# Patient Record
Sex: MALE | Race: WHITE | NOT HISPANIC OR LATINO | Employment: UNEMPLOYED | URBAN - NONMETROPOLITAN AREA
[De-identification: names, ages, dates, MRNs, and addresses within clinical notes are randomized per-mention and may not be internally consistent; named-entity substitution may affect disease eponyms.]

---
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
## 2023-02-07 ENCOUNTER — TELEPHONE (OUTPATIENT)
Dept: FAMILY MEDICINE CLINIC | Facility: CLINIC | Age: 18
End: 2023-02-07
Payer: OTHER GOVERNMENT

## 2023-02-20 DIAGNOSIS — F90.0 ATTENTION DEFICIT HYPERACTIVITY DISORDER (ADHD), PREDOMINANTLY INATTENTIVE TYPE: ICD-10-CM

## 2023-02-21 RX ORDER — DEXTROAMPHETAMINE SACCHARATE, AMPHETAMINE ASPARTATE, DEXTROAMPHETAMINE SULFATE AND AMPHETAMINE SULFATE 2.5; 2.5; 2.5; 2.5 MG/1; MG/1; MG/1; MG/1
10 TABLET ORAL 2 TIMES DAILY
Qty: 60 TABLET | Refills: 0 | Status: SHIPPED | OUTPATIENT
Start: 2023-02-21 | End: 2023-03-30 | Stop reason: SDUPTHER

## 2023-02-27 ENCOUNTER — TELEPHONE (OUTPATIENT)
Dept: FAMILY MEDICINE CLINIC | Facility: CLINIC | Age: 18
End: 2023-02-27
Payer: OTHER GOVERNMENT

## 2023-02-28 DIAGNOSIS — F90.0 ATTENTION DEFICIT HYPERACTIVITY DISORDER (ADHD), PREDOMINANTLY INATTENTIVE TYPE: Primary | ICD-10-CM

## 2023-02-28 RX ORDER — METHYLPHENIDATE HYDROCHLORIDE 10 MG/1
TABLET ORAL
Qty: 45 TABLET | Refills: 0 | Status: SHIPPED | OUTPATIENT
Start: 2023-02-28 | End: 2023-03-30

## 2023-03-30 ENCOUNTER — TELEPHONE (OUTPATIENT)
Dept: FAMILY MEDICINE CLINIC | Facility: CLINIC | Age: 18
End: 2023-03-30
Payer: OTHER GOVERNMENT

## 2023-03-30 DIAGNOSIS — F90.0 ATTENTION DEFICIT HYPERACTIVITY DISORDER (ADHD), PREDOMINANTLY INATTENTIVE TYPE: ICD-10-CM

## 2023-03-30 RX ORDER — DEXTROAMPHETAMINE SACCHARATE, AMPHETAMINE ASPARTATE, DEXTROAMPHETAMINE SULFATE AND AMPHETAMINE SULFATE 2.5; 2.5; 2.5; 2.5 MG/1; MG/1; MG/1; MG/1
10 TABLET ORAL 2 TIMES DAILY
Qty: 60 TABLET | Refills: 0 | Status: SHIPPED | OUTPATIENT
Start: 2023-03-30

## 2023-03-30 NOTE — TELEPHONE ENCOUNTER
Brett called and you switched him from Adderall to Ritalin because of the medication shortage.  The Ritalin is not working he would like to be switched back or maybe to a extended release if possible       Please advise

## 2023-05-22 ENCOUNTER — OFFICE VISIT (OUTPATIENT)
Dept: PSYCHIATRY | Facility: CLINIC | Age: 18
End: 2023-05-22
Payer: OTHER GOVERNMENT

## 2023-05-22 VITALS
OXYGEN SATURATION: 99 % | SYSTOLIC BLOOD PRESSURE: 146 MMHG | TEMPERATURE: 98.2 F | HEIGHT: 70 IN | DIASTOLIC BLOOD PRESSURE: 81 MMHG | HEART RATE: 122 BPM | BODY MASS INDEX: 22.33 KG/M2 | WEIGHT: 156 LBS

## 2023-05-22 DIAGNOSIS — F90.0 ATTENTION DEFICIT HYPERACTIVITY DISORDER (ADHD), PREDOMINANTLY INATTENTIVE TYPE: ICD-10-CM

## 2023-05-22 PROCEDURE — 99214 OFFICE O/P EST MOD 30 MIN: CPT | Performed by: NURSE PRACTITIONER

## 2023-05-22 RX ORDER — DEXTROAMPHETAMINE SACCHARATE, AMPHETAMINE ASPARTATE, DEXTROAMPHETAMINE SULFATE AND AMPHETAMINE SULFATE 5; 5; 5; 5 MG/1; MG/1; MG/1; MG/1
20 TABLET ORAL 2 TIMES DAILY
Qty: 60 TABLET | Refills: 0 | Status: SHIPPED | OUTPATIENT
Start: 2023-05-22 | End: 2024-05-21

## 2023-05-22 NOTE — PROGRESS NOTES
"      Truman Sherwood is a 18 y.o. male is here today for medication management follow-up.    Chief Complaint:  Recheck ADHD    History of Present Illness: Patient presents for his follow-up appointment at [facility].  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  Says he really like the job.  Now that he is having to concentrate more he feels his adderall is not helping as much.  Says he feels \"scatter brained\" and worries this will affect his job performance.  Denies any depression.  No excessive anxiety.  Body mass index is 22.38 kg/m². weight loss 5 lbs since November.  Sleeping well.  Does have to be at work at 5 AM.  Takes his second dosage of med around noon.  Mood is stable.  He is not taking the adderall on the weekends unless he has some type of project he is working on.    PHQ-2 Depression Screening  Little interest or pleasure in doing things? 0-->not at all   Feeling down, depressed, or hopeless? 0-->not at all   PHQ-2 Total Score 0              . The following portions of the patient's history were reviewed and updated as appropriate: allergies, current medications, past family history, past medical history, past social history, past surgical history and problem list.    Review of Systems   Constitutional: Negative for activity change, appetite change and fatigue.   HENT: Negative.    Eyes: Negative for visual disturbance.   Respiratory: Negative.    Cardiovascular: Negative.    Gastrointestinal: Negative for nausea.   Endocrine: Negative.    Genitourinary: Negative.    Musculoskeletal: Negative for arthralgias.   Skin: Negative.    Allergic/Immunologic: Negative.    Neurological: Negative for dizziness, seizures and headaches.   Hematological: Negative.    Psychiatric/Behavioral: Negative for agitation, behavioral problems, confusion, decreased concentration, dysphoric mood, hallucinations, self-injury, sleep disturbance and suicidal ideas. The " "patient is nervous/anxious. The patient is not hyperactive.      Reviewed copied data and there are no changes    Objective   Physical Exam  Vitals reviewed.   Constitutional:       Appearance: Normal appearance.   Musculoskeletal:      Cervical back: Normal range of motion and neck supple.   Neurological:      General: No focal deficit present.      Mental Status: He is alert and oriented to person, place, and time.   Psychiatric:         Attention and Perception: Attention normal.         Mood and Affect: Mood normal.         Speech: Speech normal.         Behavior: Behavior normal. Behavior is cooperative.         Thought Content: Thought content normal.         Cognition and Memory: Cognition normal.         Judgment: Judgment normal.      Comments: Pleasant and cooperative       Blood pressure 146/81, pulse (!) 122, temperature 98.2 °F (36.8 °C), height 177.8 cm (70\"), weight 70.8 kg (156 lb), SpO2 99 %.    Medication List:   Current Outpatient Medications   Medication Sig Dispense Refill   • amphetamine-dextroamphetamine (Adderall) 20 MG tablet Take 1 tablet by mouth 2 (Two) Times a Day. 60 tablet 0   • cetirizine (zyrTEC) 10 MG tablet Take 10 mg by mouth Daily.     • Diclofenac Sodium (VOLTAREN) 1 % gel gel Daily As Needed.       No current facility-administered medications for this visit.     Reviewed copied data and there are no changes    Mental Status Exam:   Hygiene:   good  Cooperation:  Cooperative  Eye Contact:  Good  Psychomotor Behavior:  Appropriate  Affect:  Full range  Hopelessness: Denies  Speech:  Normal  Thought Process:  Goal directed  Thought Content:  Normal  Suicidal:  None  Homicidal:  None  Hallucinations:  None  Delusion:  None  Memory:  Intact  Orientation:  Person, Place, Time and Situation  Reliability:  good  Insight:  Good  Judgement:  Good  Impulse Control:  Good  Physical/Medical Issues:  No     Assessment & Plan   Problems Addressed this Visit    None  Visit Diagnoses     " Attention deficit hyperactivity disorder (ADHD), predominantly inattentive type        Relevant Medications    amphetamine-dextroamphetamine (Adderall) 20 MG tablet      Diagnoses       Codes Comments    Attention deficit hyperactivity disorder (ADHD), predominantly inattentive type     ICD-10-CM: F90.0  ICD-9-CM: 314.00           Functionality: pt having minimal impairment in important areas of daily functioning.  Prognosis: Good dependent on medication/follow up and treatment plan compliance.  star reviewed.  uds obtained today and pending.     Increasing the adderall to 20 mg BID.  Told him he can try taking one half adderall as his second dosage during the day if he does not feel he needs 20mg later in the day.  Refill submitted.     Continuing efforts to promote the therapeutic alliance, address the patient's issues, and strengthen self awareness, insights, and coping skills Patient is agreeable to call the Clinic with worsening symptoms.    Patient is aware to call 911 or go to the nearest ER should begin having SI/HI. RTC 4 months.  Sooner if needed or if the adderall is not working.  We can try to preauth vyvanse at that point if needed.               This document has been electronically signed by ARMANDO Hebert on   May 22, 2023 08:42 EDT.

## 2023-08-24 ENCOUNTER — OFFICE VISIT (OUTPATIENT)
Dept: PSYCHIATRY | Facility: CLINIC | Age: 18
End: 2023-08-24
Payer: OTHER GOVERNMENT

## 2023-08-24 VITALS
WEIGHT: 166.4 LBS | OXYGEN SATURATION: 98 % | SYSTOLIC BLOOD PRESSURE: 147 MMHG | TEMPERATURE: 98.9 F | DIASTOLIC BLOOD PRESSURE: 76 MMHG | BODY MASS INDEX: 23.82 KG/M2 | HEART RATE: 107 BPM | HEIGHT: 70 IN

## 2023-08-24 DIAGNOSIS — F40.10 SOCIAL ANXIETY DISORDER: ICD-10-CM

## 2023-08-24 DIAGNOSIS — F90.0 ATTENTION DEFICIT HYPERACTIVITY DISORDER (ADHD), PREDOMINANTLY INATTENTIVE TYPE: Primary | ICD-10-CM

## 2023-08-24 DIAGNOSIS — F41.1 GENERALIZED ANXIETY DISORDER: ICD-10-CM

## 2023-08-24 PROCEDURE — 99214 OFFICE O/P EST MOD 30 MIN: CPT | Performed by: NURSE PRACTITIONER

## 2023-08-24 RX ORDER — DEXTROAMPHETAMINE SACCHARATE, AMPHETAMINE ASPARTATE, DEXTROAMPHETAMINE SULFATE AND AMPHETAMINE SULFATE 5; 5; 5; 5 MG/1; MG/1; MG/1; MG/1
20 TABLET ORAL 2 TIMES DAILY
Qty: 60 TABLET | Refills: 0 | Status: SHIPPED | OUTPATIENT
Start: 2023-08-24 | End: 2024-08-23

## 2023-08-24 NOTE — PROGRESS NOTES
Truman Sherwood is a 18 y.o. male is here today for medication management follow-up.    Chief Complaint:  Recheck ADHD    History of Present Illness: Patient presents for his follow-up appointment at [facility].  He states that he is doing much better with anxiety on the Zoloft.  He states he feels better all around.  Anxiety is rated at a 1-2 out of 10 with 10 being severe.  He states it has really helped with his repetitive thoughts that he feels like it has actually helped with his focus and concentration.  The Adderall continues to help.  He denies any depression.  Sleeping well without any difficulty.  No negative side effects to the meds..Body mass index is 23.88 kg/mý.  Weight gain of 10 pounds since last office visit.  No medical stressors.  He is very pleased with progress.      .         . The following portions of the patient's history were reviewed and updated as appropriate: allergies, current medications, past family history, past medical history, past social history, past surgical history and problem list.    Review of Systems   Constitutional:  Negative for activity change, appetite change and fatigue.   HENT: Negative.     Eyes:  Negative for visual disturbance.   Respiratory: Negative.     Cardiovascular: Negative.    Gastrointestinal:  Negative for nausea.   Endocrine: Negative.    Genitourinary: Negative.    Musculoskeletal:  Negative for arthralgias.   Skin: Negative.    Allergic/Immunologic: Negative.    Neurological:  Negative for dizziness, seizures and headaches.   Hematological: Negative.    Psychiatric/Behavioral:  Negative for agitation, behavioral problems, confusion, decreased concentration, dysphoric mood, hallucinations, self-injury, sleep disturbance and suicidal ideas. The patient is nervous/anxious. The patient is not hyperactive.    Reviewed copied data and there are no changes    Objective   Physical Exam  Vitals reviewed.   Constitutional:        "Appearance: Normal appearance.   Musculoskeletal:      Cervical back: Normal range of motion and neck supple.   Neurological:      General: No focal deficit present.      Mental Status: He is alert and oriented to person, place, and time.   Psychiatric:         Attention and Perception: Attention normal.         Mood and Affect: Mood normal.         Speech: Speech normal.         Behavior: Behavior normal. Behavior is cooperative.         Thought Content: Thought content normal.         Cognition and Memory: Cognition normal.         Judgment: Judgment normal.      Comments: Pleasant and cooperative     Blood pressure 147/76, pulse 107, temperature 98.9 øF (37.2 øC), height 177.8 cm (70\"), weight 75.5 kg (166 lb 6.4 oz), SpO2 98 %.    Medication List:   Current Outpatient Medications   Medication Sig Dispense Refill    amphetamine-dextroamphetamine (Adderall) 20 MG tablet Take 1 tablet by mouth 2 (Two) Times a Day. 60 tablet 0    sertraline (Zoloft) 50 MG tablet Take 1 tablet by mouth Daily. 30 tablet 2    cetirizine (zyrTEC) 10 MG tablet Take 10 mg by mouth Daily.      Diclofenac Sodium (VOLTAREN) 1 % gel gel Daily As Needed.       No current facility-administered medications for this visit.     Reviewed copied data and there are no changes    Mental Status Exam:   Hygiene:   good  Cooperation:  Cooperative  Eye Contact:  Good  Psychomotor Behavior:  Appropriate  Affect:  Full range  Hopelessness: Denies  Speech:  Normal  Thought Process:  Goal directed  Thought Content:  Normal  Suicidal:  None  Homicidal:  None  Hallucinations:  None  Delusion:  None  Memory:  Intact  Orientation:  Person, Place, Time and Situation  Reliability:  good  Insight:  Good  Judgement:  Good  Impulse Control:  Good  Physical/Medical Issues:  No     Assessment & Plan   Problems Addressed this Visit    None  Visit Diagnoses       Attention deficit hyperactivity disorder (ADHD), predominantly inattentive type    -  Primary    Relevant " Medications    sertraline (Zoloft) 50 MG tablet    amphetamine-dextroamphetamine (Adderall) 20 MG tablet    Generalized anxiety disorder        Relevant Medications    sertraline (Zoloft) 50 MG tablet    amphetamine-dextroamphetamine (Adderall) 20 MG tablet    Social anxiety disorder        Relevant Medications    sertraline (Zoloft) 50 MG tablet    amphetamine-dextroamphetamine (Adderall) 20 MG tablet          Diagnoses         Codes Comments    Attention deficit hyperactivity disorder (ADHD), predominantly inattentive type    -  Primary ICD-10-CM: F90.0  ICD-9-CM: 314.00     Generalized anxiety disorder     ICD-10-CM: F41.1  ICD-9-CM: 300.02     Social anxiety disorder     ICD-10-CM: F40.10  ICD-9-CM: 300.23             Functionality: pt having moderate impairment in important areas of daily functioning.  Prognosis: Good dependent on medication/follow up and treatment plan compliance.  star reviewed.  uds reviewed.      He will continue the Adderall for the ADHD.  He will continue the Zoloft for the anxiety disorder.  Refills have been submitted.  He verbalized understanding.  Should he have any problems he will notify me.        Continuing efforts to promote the therapeutic alliance, address the patient's issues, and strengthen self awareness, insights, and coping skills Patient is agreeable to call the Clinic with worsening symptoms.    Patient is aware to call 911 or go to the nearest ER should begin having SI/HI. RTC 12 weeks             This document has been electronically signed by ARMANDO Hebert on   August 24, 2023 13:15 EDT.

## 2023-11-22 DIAGNOSIS — F40.10 SOCIAL ANXIETY DISORDER: ICD-10-CM

## 2023-11-27 ENCOUNTER — OFFICE VISIT (OUTPATIENT)
Dept: PSYCHIATRY | Facility: CLINIC | Age: 18
End: 2023-11-27
Payer: OTHER GOVERNMENT

## 2023-11-27 VITALS
HEIGHT: 70 IN | DIASTOLIC BLOOD PRESSURE: 79 MMHG | BODY MASS INDEX: 23.71 KG/M2 | HEART RATE: 111 BPM | TEMPERATURE: 97.1 F | WEIGHT: 165.6 LBS | OXYGEN SATURATION: 99 % | SYSTOLIC BLOOD PRESSURE: 132 MMHG

## 2023-11-27 DIAGNOSIS — F40.10 SOCIAL ANXIETY DISORDER: ICD-10-CM

## 2023-11-27 DIAGNOSIS — F90.0 ATTENTION DEFICIT HYPERACTIVITY DISORDER (ADHD), PREDOMINANTLY INATTENTIVE TYPE: Primary | ICD-10-CM

## 2023-11-27 DIAGNOSIS — F41.1 GENERALIZED ANXIETY DISORDER: ICD-10-CM

## 2023-11-27 PROCEDURE — 99214 OFFICE O/P EST MOD 30 MIN: CPT | Performed by: NURSE PRACTITIONER

## 2023-11-27 RX ORDER — DEXTROAMPHETAMINE SACCHARATE, AMPHETAMINE ASPARTATE, DEXTROAMPHETAMINE SULFATE AND AMPHETAMINE SULFATE 5; 5; 5; 5 MG/1; MG/1; MG/1; MG/1
20 TABLET ORAL 2 TIMES DAILY
Qty: 60 TABLET | Refills: 0 | Status: SHIPPED | OUTPATIENT
Start: 2023-11-27 | End: 2024-11-26

## 2023-11-27 RX ORDER — SERTRALINE HYDROCHLORIDE 100 MG/1
100 TABLET, FILM COATED ORAL DAILY
Qty: 30 TABLET | Refills: 2 | Status: SHIPPED | OUTPATIENT
Start: 2023-11-27

## 2023-11-27 NOTE — PROGRESS NOTES
Truman Sherwood is a 18 y.o. male is here today for medication management follow-up.    Chief Complaint:  Recheck ADHD    History of Present Illness: Patient presents for his follow-up appointment at [facility].  He states that he is doing well however he feels like his anxiety is creeping back up and would like to increase his Zoloft up to 100 mg.  He is starting to replay things in his mind again and feels like the dosage needs increased.  He denies any depression.  Denies panic attacks.  The Adderall continues to help with focus and concentration.  He continues to work full-time.  He has now bought a house.  Has set a date with his fiancée and is getting  next October.  He is excited about this.  Being well at night without difficulty.  Mood is stable.  No medical stressors.Body mass index is 23.76 kg/m².  No appetite changes.  He does not take the stimulant on the weekends usually only on workdays.  He is also continuing to take classes in college and will finish up with that around May.      .         . The following portions of the patient's history were reviewed and updated as appropriate: allergies, current medications, past family history, past medical history, past social history, past surgical history and problem list.    Review of Systems   Constitutional:  Negative for activity change, appetite change and fatigue.   HENT: Negative.     Eyes:  Negative for visual disturbance.   Respiratory: Negative.     Cardiovascular: Negative.    Gastrointestinal:  Negative for nausea.   Endocrine: Negative.    Genitourinary: Negative.    Musculoskeletal:  Negative for arthralgias.   Skin: Negative.    Allergic/Immunologic: Negative.    Neurological:  Negative for dizziness, seizures and headaches.   Hematological: Negative.    Psychiatric/Behavioral:  Negative for agitation, behavioral problems, confusion, decreased concentration, dysphoric mood, hallucinations, self-injury,  "sleep disturbance and suicidal ideas. The patient is nervous/anxious. The patient is not hyperactive.      Reviewed copied data and there are no changes    Objective   Physical Exam  Vitals reviewed.   Constitutional:       Appearance: Normal appearance.   Musculoskeletal:      Cervical back: Normal range of motion and neck supple.   Neurological:      General: No focal deficit present.      Mental Status: He is alert and oriented to person, place, and time.   Psychiatric:         Attention and Perception: Attention normal.         Mood and Affect: Mood normal.         Speech: Speech normal.         Behavior: Behavior normal. Behavior is cooperative.         Thought Content: Thought content normal.         Cognition and Memory: Cognition normal.         Judgment: Judgment normal.      Comments: Pleasant and cooperative       Blood pressure 132/79, pulse 111, temperature 97.1 °F (36.2 °C), height 177.8 cm (70\"), weight 75.1 kg (165 lb 9.6 oz), SpO2 99%.    Medication List:   Current Outpatient Medications   Medication Sig Dispense Refill    amphetamine-dextroamphetamine (Adderall) 20 MG tablet Take 1 tablet by mouth 2 (Two) Times a Day. 60 tablet 0    sertraline (ZOLOFT) 100 MG tablet Take 1 tablet by mouth Daily. 30 tablet 2    cetirizine (zyrTEC) 10 MG tablet Take 10 mg by mouth Daily.      Diclofenac Sodium (VOLTAREN) 1 % gel gel Daily As Needed.       No current facility-administered medications for this visit.     Reviewed copied data and there are no changes    Mental Status Exam:   Hygiene:   good  Cooperation:  Cooperative  Eye Contact:  Good  Psychomotor Behavior:  Appropriate  Affect:  Full range  Hopelessness: Denies  Speech:  Normal  Thought Process:  Goal directed  Thought Content:  Normal  Suicidal:  None  Homicidal:  None  Hallucinations:  None  Delusion:  None  Memory:  Intact  Orientation:  Person, Place, Time and Situation  Reliability:  good  Insight:  Good  Judgement:  Good  Impulse Control:  " Good  Physical/Medical Issues:  No     Assessment & Plan   Problems Addressed this Visit    None  Visit Diagnoses       Attention deficit hyperactivity disorder (ADHD), predominantly inattentive type    -  Primary    Relevant Medications    amphetamine-dextroamphetamine (Adderall) 20 MG tablet    sertraline (ZOLOFT) 100 MG tablet    Generalized anxiety disorder        Relevant Medications    amphetamine-dextroamphetamine (Adderall) 20 MG tablet    sertraline (ZOLOFT) 100 MG tablet    Social anxiety disorder        Relevant Medications    amphetamine-dextroamphetamine (Adderall) 20 MG tablet    sertraline (ZOLOFT) 100 MG tablet          Diagnoses         Codes Comments    Attention deficit hyperactivity disorder (ADHD), predominantly inattentive type    -  Primary ICD-10-CM: F90.0  ICD-9-CM: 314.00     Generalized anxiety disorder     ICD-10-CM: F41.1  ICD-9-CM: 300.02     Social anxiety disorder     ICD-10-CM: F40.10  ICD-9-CM: 300.23             Functionality: pt having moderate impairment in important areas of daily functioning.  Prognosis: Good dependent on medication/follow up and treatment plan compliance.  star reviewed.  uds reviewed.      He will continue the Adderall for the ADHD.  Going to increase the Zoloft up to 100 mg for the ongoing anxiety.  Refills have been submitted.  We will return to clinic in 12 weeks.  He does not feel like he needs to come back in for a recheck sooner and states that he will notify me should any problems develop or his anxiety worsens.       Continuing efforts to promote the therapeutic alliance, address the patient's issues, and strengthen self awareness, insights, and coping skills Patient is agreeable to call the Clinic with worsening symptoms.    Patient is aware to call 911 or go to the nearest ER should begin having SI/HI. RTC 12 weeks             This document has been electronically signed by ARMANDO Hebert on   November 27, 2023 15:17 EST.

## 2024-02-27 ENCOUNTER — OFFICE VISIT (OUTPATIENT)
Dept: PSYCHIATRY | Facility: CLINIC | Age: 19
End: 2024-02-27
Payer: OTHER GOVERNMENT

## 2024-02-27 VITALS
DIASTOLIC BLOOD PRESSURE: 83 MMHG | BODY MASS INDEX: 23.45 KG/M2 | TEMPERATURE: 97.5 F | HEART RATE: 88 BPM | WEIGHT: 163.8 LBS | SYSTOLIC BLOOD PRESSURE: 153 MMHG | OXYGEN SATURATION: 97 % | HEIGHT: 70 IN

## 2024-02-27 DIAGNOSIS — F40.10 SOCIAL ANXIETY DISORDER: ICD-10-CM

## 2024-02-27 DIAGNOSIS — F90.0 ATTENTION DEFICIT HYPERACTIVITY DISORDER (ADHD), PREDOMINANTLY INATTENTIVE TYPE: ICD-10-CM

## 2024-02-27 PROCEDURE — 99214 OFFICE O/P EST MOD 30 MIN: CPT | Performed by: NURSE PRACTITIONER

## 2024-02-27 RX ORDER — DEXTROAMPHETAMINE SACCHARATE, AMPHETAMINE ASPARTATE, DEXTROAMPHETAMINE SULFATE AND AMPHETAMINE SULFATE 5; 5; 5; 5 MG/1; MG/1; MG/1; MG/1
20 TABLET ORAL 2 TIMES DAILY
Qty: 60 TABLET | Refills: 0 | Status: SHIPPED | OUTPATIENT
Start: 2024-02-27 | End: 2025-02-26

## 2024-02-27 RX ORDER — SERTRALINE HYDROCHLORIDE 100 MG/1
100 TABLET, FILM COATED ORAL DAILY
Qty: 30 TABLET | Refills: 2 | Status: SHIPPED | OUTPATIENT
Start: 2024-02-27

## 2024-02-27 NOTE — PROGRESS NOTES
Truman Sherwood is a 18 y.o. male is here today for medication management follow-up.    Chief Complaint:  Recheck ADHD    History of Present Illness: Patient presents for his follow-up appointment at [facility].  He states that he is doing really well.  He continues to work full-time.  The stimulant continues to help with focus and concentration.  He states he is taking it anywhere from 5-8 times a week depending upon the length of his workday etc.  Denies any negative side effects.  The increase in the Zoloft from the last visit really did help his anxiety.  He currently rates his anxiety a 1-2 out of 10 with 10 being severe.  Sleeping well at night without difficulty.  Denies any depression.  No medical stressors.Body mass index is 23.5 kg/m².  No appetite changes.  Mood is stable.  .         . The following portions of the patient's history were reviewed and updated as appropriate: allergies, current medications, past family history, past medical history, past social history, past surgical history and problem list.    Review of Systems   Constitutional:  Negative for activity change, appetite change and fatigue.   HENT: Negative.     Eyes:  Negative for visual disturbance.   Respiratory: Negative.     Cardiovascular: Negative.    Gastrointestinal:  Negative for nausea.   Endocrine: Negative.    Genitourinary: Negative.    Musculoskeletal:  Negative for arthralgias.   Skin: Negative.    Allergic/Immunologic: Negative.    Neurological:  Negative for dizziness, seizures and headaches.   Hematological: Negative.    Psychiatric/Behavioral:  Negative for agitation, behavioral problems, confusion, decreased concentration, dysphoric mood, hallucinations, self-injury, sleep disturbance and suicidal ideas. The patient is nervous/anxious. The patient is not hyperactive.      Reviewed copied data and there are no changes    Objective   Physical Exam  Vitals reviewed.   Constitutional:        "Appearance: Normal appearance.   Musculoskeletal:      Cervical back: Normal range of motion and neck supple.   Neurological:      General: No focal deficit present.      Mental Status: He is alert and oriented to person, place, and time.   Psychiatric:         Attention and Perception: Attention normal.         Mood and Affect: Mood normal.         Speech: Speech normal.         Behavior: Behavior normal. Behavior is cooperative.         Thought Content: Thought content normal.         Cognition and Memory: Cognition normal.         Judgment: Judgment normal.      Comments: Pleasant and cooperative       Blood pressure 153/83, pulse 88, temperature 97.5 °F (36.4 °C), height 177.8 cm (70\"), weight 74.3 kg (163 lb 12.8 oz), SpO2 97%.    Medication List:   Current Outpatient Medications   Medication Sig Dispense Refill    amphetamine-dextroamphetamine (Adderall) 20 MG tablet Take 1 tablet by mouth 2 (Two) Times a Day. 60 tablet 0    cetirizine (zyrTEC) 10 MG tablet Take 1 tablet by mouth Daily.      sertraline (ZOLOFT) 100 MG tablet Take 1 tablet by mouth Daily. 30 tablet 2     No current facility-administered medications for this visit.     Reviewed copied data and there are no changes    Mental Status Exam:   Hygiene:   good  Cooperation:  Cooperative  Eye Contact:  Good  Psychomotor Behavior:  Appropriate  Affect:  Full range  Hopelessness: Denies  Speech:  Normal  Thought Process:  Goal directed  Thought Content:  Normal  Suicidal:  None  Homicidal:  None  Hallucinations:  None  Delusion:  None  Memory:  Intact  Orientation:  Person, Place, Time and Situation  Reliability:  good  Insight:  Good  Judgement:  Good  Impulse Control:  Good  Physical/Medical Issues:  No     Assessment & Plan   Problems Addressed this Visit    None  Visit Diagnoses       Social anxiety disorder        Relevant Medications    sertraline (ZOLOFT) 100 MG tablet    amphetamine-dextroamphetamine (Adderall) 20 MG tablet    Attention deficit " hyperactivity disorder (ADHD), predominantly inattentive type        Relevant Medications    sertraline (ZOLOFT) 100 MG tablet    amphetamine-dextroamphetamine (Adderall) 20 MG tablet          Diagnoses         Codes Comments    Social anxiety disorder     ICD-10-CM: F40.10  ICD-9-CM: 300.23     Attention deficit hyperactivity disorder (ADHD), predominantly inattentive type     ICD-10-CM: F90.0  ICD-9-CM: 314.00             Functionality: pt having moderate impairment in important areas of daily functioning.  Prognosis: Good dependent on medication/follow up and treatment plan compliance.  star reviewed.  uds reviewed.  Uds obtained today and pending.      He will continue the Adderall for the ADHD.  Continue the Zoloft for the anxiety.  Refills have been submitted.  return to clinic in 12 weeks.      Continuing efforts to promote the therapeutic alliance, address the patient's issues, and strengthen self awareness, insights, and coping skills Patient is agreeable to call the Clinic with worsening symptoms.    Patient is aware to call 911 or go to the nearest ER should begin having SI/HI. RTC 12 weeks             This document has been electronically signed by ARMANDO Hebert on   February 27, 2024 10:03 EST.

## 2024-02-28 DIAGNOSIS — F40.10 SOCIAL ANXIETY DISORDER: ICD-10-CM

## 2024-02-28 RX ORDER — SERTRALINE HYDROCHLORIDE 100 MG/1
100 TABLET, FILM COATED ORAL DAILY
Qty: 30 TABLET | Refills: 2 | OUTPATIENT
Start: 2024-02-28

## 2024-05-28 ENCOUNTER — OFFICE VISIT (OUTPATIENT)
Dept: PSYCHIATRY | Facility: CLINIC | Age: 19
End: 2024-05-28
Payer: COMMERCIAL

## 2024-05-28 VITALS
HEART RATE: 79 BPM | OXYGEN SATURATION: 98 % | DIASTOLIC BLOOD PRESSURE: 78 MMHG | SYSTOLIC BLOOD PRESSURE: 156 MMHG | TEMPERATURE: 98.4 F | WEIGHT: 170.6 LBS | BODY MASS INDEX: 24.42 KG/M2 | HEIGHT: 70 IN

## 2024-05-28 DIAGNOSIS — F40.10 SOCIAL ANXIETY DISORDER: ICD-10-CM

## 2024-05-28 DIAGNOSIS — F90.0 ATTENTION DEFICIT HYPERACTIVITY DISORDER (ADHD), PREDOMINANTLY INATTENTIVE TYPE: Primary | ICD-10-CM

## 2024-05-28 DIAGNOSIS — F41.1 GENERALIZED ANXIETY DISORDER: ICD-10-CM

## 2024-05-28 PROCEDURE — 99214 OFFICE O/P EST MOD 30 MIN: CPT | Performed by: NURSE PRACTITIONER

## 2024-05-28 RX ORDER — SERTRALINE HYDROCHLORIDE 100 MG/1
100 TABLET, FILM COATED ORAL DAILY
Qty: 30 TABLET | Refills: 2 | Status: SHIPPED | OUTPATIENT
Start: 2024-05-28

## 2024-05-28 RX ORDER — DEXTROAMPHETAMINE SACCHARATE, AMPHETAMINE ASPARTATE, DEXTROAMPHETAMINE SULFATE AND AMPHETAMINE SULFATE 5; 5; 5; 5 MG/1; MG/1; MG/1; MG/1
TABLET ORAL
Qty: 45 TABLET | Refills: 0 | Status: SHIPPED | OUTPATIENT
Start: 2024-05-28

## 2024-05-28 NOTE — PROGRESS NOTES
Truman Sherwood is a 19 y.o. male is here today for medication management follow-up.    Chief Complaint:  Recheck ADHD    History of Present Illness: Patient presents for his follow-up appointment at [facility].  He continues to do well.  He has been out of the Adderall for quite some time.  He has not been able to get it.  He can tell a difference in his focus and concentration without it.  States he is usually taking 1 in the morning and about half in the afternoon when he did have it.  He denies any depression.  Anxiety is stable and manageable.  He continues to work full-time.  Denies any negative side effects to the med.  Sleep is adequate.  Mood is stable.Body mass index is 24.48 kg/m².  Weight gain of 7 pounds since last office visit.  Getting  in October and continues to be very excited about this.      .         . The following portions of the patient's history were reviewed and updated as appropriate: allergies, current medications, past family history, past medical history, past social history, past surgical history and problem list.    Review of Systems   Constitutional:  Negative for activity change, appetite change and fatigue.   HENT: Negative.     Eyes:  Negative for visual disturbance.   Respiratory: Negative.     Cardiovascular: Negative.    Gastrointestinal:  Negative for nausea.   Endocrine: Negative.    Genitourinary: Negative.    Musculoskeletal:  Negative for arthralgias.   Skin: Negative.    Allergic/Immunologic: Negative.    Neurological:  Negative for dizziness, seizures and headaches.   Hematological: Negative.    Psychiatric/Behavioral:  Negative for agitation, behavioral problems, confusion, decreased concentration, dysphoric mood, hallucinations, self-injury, sleep disturbance and suicidal ideas. The patient is nervous/anxious. The patient is not hyperactive.      Reviewed copied data and there are no changes    Objective   Physical Exam  Vitals  reviewed.   Constitutional:       Appearance: Normal appearance.   Musculoskeletal:      Cervical back: Normal range of motion and neck supple.   Neurological:      General: No focal deficit present.      Mental Status: He is alert and oriented to person, place, and time.   Psychiatric:         Attention and Perception: Attention normal.         Mood and Affect: Mood normal.         Speech: Speech normal.         Behavior: Behavior normal. Behavior is cooperative.         Thought Content: Thought content normal.         Cognition and Memory: Cognition normal.         Judgment: Judgment normal.      Comments: Pleasant and cooperative       There were no vitals taken for this visit.    Medication List:   Current Outpatient Medications   Medication Sig Dispense Refill    amphetamine-dextroamphetamine (Adderall) 20 MG tablet Take 1 tablet by mouth 2 (Two) Times a Day. 60 tablet 0    cetirizine (zyrTEC) 10 MG tablet Take 1 tablet by mouth Daily.      sertraline (ZOLOFT) 100 MG tablet Take 1 tablet by mouth Daily. 30 tablet 2     No current facility-administered medications for this visit.     Reviewed copied data and there are no changes    Mental Status Exam:   Hygiene:   good  Cooperation:  Cooperative  Eye Contact:  Good  Psychomotor Behavior:  Appropriate  Affect:  Full range  Hopelessness: Denies  Speech:  Normal  Thought Process:  Goal directed  Thought Content:  Normal  Suicidal:  None  Homicidal:  None  Hallucinations:  None  Delusion:  None  Memory:  Intact  Orientation:  Person, Place, Time and Situation  Reliability:  good  Insight:  Good  Judgement:  Good  Impulse Control:  Good  Physical/Medical Issues:  No     Assessment & Plan   Problems Addressed this Visit    None  Visit Diagnoses       Attention deficit hyperactivity disorder (ADHD), predominantly inattentive type    -  Primary    Social anxiety disorder        Generalized anxiety disorder              Diagnoses         Codes Comments    Attention  deficit hyperactivity disorder (ADHD), predominantly inattentive type    -  Primary ICD-10-CM: F90.0  ICD-9-CM: 314.00     Social anxiety disorder     ICD-10-CM: F40.10  ICD-9-CM: 300.23     Generalized anxiety disorder     ICD-10-CM: F41.1  ICD-9-CM: 300.02             Functionality: pt having moderate impairment in important areas of daily functioning.  Prognosis: Good dependent on medication/follow up and treatment plan compliance.  star reviewed.  uds reviewed.      He is currently pleased with progress.  He will continue the Adderall for the ADHD.  He will continue the Zoloft for the anxiety disorder.  Refills of all been submitted.   Continuing efforts to promote the therapeutic alliance, address the patient's issues, and strengthen self awareness, insights, and coping skills Patient is agreeable to call the Clinic with worsening symptoms.    Patient is aware to call 911 or go to the nearest ER should begin having SI/HI. RTC 12 weeks             This document has been electronically signed by ARMANDO Hebert on   May 28, 2024 08:24 EDT.

## 2024-07-13 DIAGNOSIS — F40.10 SOCIAL ANXIETY DISORDER: ICD-10-CM

## 2024-07-13 DIAGNOSIS — F90.0 ATTENTION DEFICIT HYPERACTIVITY DISORDER (ADHD), PREDOMINANTLY INATTENTIVE TYPE: ICD-10-CM

## 2024-07-13 RX ORDER — DEXTROAMPHETAMINE SACCHARATE, AMPHETAMINE ASPARTATE, DEXTROAMPHETAMINE SULFATE AND AMPHETAMINE SULFATE 5; 5; 5; 5 MG/1; MG/1; MG/1; MG/1
TABLET ORAL
Qty: 45 TABLET | Refills: 0 | Status: CANCELLED | OUTPATIENT
Start: 2024-07-13

## 2024-07-15 DIAGNOSIS — F90.0 ATTENTION DEFICIT HYPERACTIVITY DISORDER (ADHD), PREDOMINANTLY INATTENTIVE TYPE: ICD-10-CM

## 2024-07-15 RX ORDER — DEXTROAMPHETAMINE SACCHARATE, AMPHETAMINE ASPARTATE, DEXTROAMPHETAMINE SULFATE AND AMPHETAMINE SULFATE 5; 5; 5; 5 MG/1; MG/1; MG/1; MG/1
TABLET ORAL
Qty: 45 TABLET | Refills: 0 | Status: SHIPPED | OUTPATIENT
Start: 2024-07-15

## 2024-07-15 RX ORDER — SERTRALINE HYDROCHLORIDE 100 MG/1
100 TABLET, FILM COATED ORAL DAILY
Qty: 30 TABLET | Refills: 2 | OUTPATIENT
Start: 2024-07-15

## 2024-07-23 DIAGNOSIS — F40.10 SOCIAL ANXIETY DISORDER: ICD-10-CM

## 2024-07-23 RX ORDER — SERTRALINE HYDROCHLORIDE 100 MG/1
100 TABLET, FILM COATED ORAL DAILY
Qty: 30 TABLET | Refills: 2 | OUTPATIENT
Start: 2024-07-23

## 2024-08-28 ENCOUNTER — OFFICE VISIT (OUTPATIENT)
Dept: PSYCHIATRY | Facility: CLINIC | Age: 19
End: 2024-08-28
Payer: COMMERCIAL

## 2024-08-28 VITALS
OXYGEN SATURATION: 99 % | SYSTOLIC BLOOD PRESSURE: 144 MMHG | HEART RATE: 109 BPM | WEIGHT: 161.6 LBS | BODY MASS INDEX: 23.13 KG/M2 | DIASTOLIC BLOOD PRESSURE: 87 MMHG | HEIGHT: 70 IN

## 2024-08-28 DIAGNOSIS — F40.10 SOCIAL ANXIETY DISORDER: Primary | ICD-10-CM

## 2024-08-28 DIAGNOSIS — F90.0 ATTENTION DEFICIT HYPERACTIVITY DISORDER (ADHD), PREDOMINANTLY INATTENTIVE TYPE: ICD-10-CM

## 2024-08-28 DIAGNOSIS — F41.1 GENERALIZED ANXIETY DISORDER: ICD-10-CM

## 2024-08-28 PROCEDURE — 99214 OFFICE O/P EST MOD 30 MIN: CPT | Performed by: NURSE PRACTITIONER

## 2024-08-28 RX ORDER — DEXTROAMPHETAMINE SACCHARATE, AMPHETAMINE ASPARTATE, DEXTROAMPHETAMINE SULFATE AND AMPHETAMINE SULFATE 5; 5; 5; 5 MG/1; MG/1; MG/1; MG/1
TABLET ORAL
Qty: 45 TABLET | Refills: 0 | Status: SHIPPED | OUTPATIENT
Start: 2024-08-28

## 2024-08-28 RX ORDER — SERTRALINE HYDROCHLORIDE 100 MG/1
100 TABLET, FILM COATED ORAL DAILY
Qty: 30 TABLET | Refills: 2 | Status: SHIPPED | OUTPATIENT
Start: 2024-08-28

## 2024-08-28 NOTE — PROGRESS NOTES
"      Truman Sherwood is a 19 y.o. male is here today for medication management follow-up.    Chief Complaint:  Recheck ADHD    History of Present Illness: Patient presents for his follow-up appointment at [facility].  He states that he has been out of his Zoloft for approximately a month.  He was told he had no refill at the pharmacy.  (Patient did have refilled the pharmacy must have been misinformed in parentheses.  He states that without the medicine his anxiety has come back and he is constantly second guessing himself at work thinking \"maybe I am not doing this right\".  So it does affect his work performance as well as his just overall anxiety in general.  He denies panic attacks denies any depression.  The stimulant continues to help with focus and concentration but he states with the anxiety starts it causes him to have problems finishing tasks.  He is usually not taking the stimulant on the weekends.  He is sleeping well at night without difficulty.  He rates his anxiety without medication at a 6 out of 10 with 10 being severe and rates it a 2 out of 10 with medication.  Mood is stable.  Medical stressors.  No negative side effects to the meds.Body mass index is 23.19 kg/m².  He does show a weight loss of 9 pounds since last office visit.  He contributes this just to being very busy over the summer.  Sleeping well without difficulty.  He is getting  in October and very excited about this      .         . The following portions of the patient's history were reviewed and updated as appropriate: allergies, current medications, past family history, past medical history, past social history, past surgical history and problem list.    Review of Systems   Constitutional:  Negative for activity change, appetite change and fatigue.   HENT: Negative.     Eyes:  Negative for visual disturbance.   Respiratory: Negative.     Cardiovascular: Negative.    Gastrointestinal:  Negative for " "nausea.   Endocrine: Negative.    Genitourinary: Negative.    Musculoskeletal:  Negative for arthralgias.   Skin: Negative.    Allergic/Immunologic: Negative.    Neurological:  Negative for dizziness, seizures and headaches.   Hematological: Negative.    Psychiatric/Behavioral:  Negative for agitation, behavioral problems, confusion, decreased concentration, dysphoric mood, hallucinations, self-injury, sleep disturbance and suicidal ideas. The patient is nervous/anxious. The patient is not hyperactive.      Reviewed copied data and there are no changes    Objective   Physical Exam  Vitals reviewed.   Constitutional:       Appearance: Normal appearance.   Musculoskeletal:      Cervical back: Normal range of motion and neck supple.   Neurological:      General: No focal deficit present.      Mental Status: He is alert and oriented to person, place, and time.   Psychiatric:         Attention and Perception: Attention normal.         Mood and Affect: Mood normal.         Speech: Speech normal.         Behavior: Behavior normal. Behavior is cooperative.         Thought Content: Thought content normal.         Cognition and Memory: Cognition normal.         Judgment: Judgment normal.      Comments: Pleasant and cooperative       Blood pressure 144/87, pulse 109, height 177.8 cm (70\"), weight 73.3 kg (161 lb 9.6 oz), SpO2 99%.    Medication List:   Current Outpatient Medications   Medication Sig Dispense Refill    amphetamine-dextroamphetamine (Adderall) 20 MG tablet Take one in the AM and one half in the afternoon 45 tablet 0    sertraline (ZOLOFT) 100 MG tablet Take 1 tablet by mouth Daily. 30 tablet 2    cetirizine (zyrTEC) 10 MG tablet Take 1 tablet by mouth Daily.       No current facility-administered medications for this visit.     Reviewed copied data and there are no changes    Mental Status Exam:   Hygiene:   good  Cooperation:  Cooperative  Eye Contact:  Good  Psychomotor Behavior:  Appropriate  Affect:  Full " range  Hopelessness: Denies  Speech:  Normal  Thought Process:  Goal directed  Thought Content:  Normal  Suicidal:  None  Homicidal:  None  Hallucinations:  None  Delusion:  None  Memory:  Intact  Orientation:  Person, Place, Time and Situation  Reliability:  good  Insight:  Good  Judgement:  Good  Impulse Control:  Good  Physical/Medical Issues:  No     Assessment & Plan   Problems Addressed this Visit    None  Visit Diagnoses       Social anxiety disorder    -  Primary    Relevant Medications    sertraline (ZOLOFT) 100 MG tablet    amphetamine-dextroamphetamine (Adderall) 20 MG tablet    Attention deficit hyperactivity disorder (ADHD), predominantly inattentive type        Relevant Medications    sertraline (ZOLOFT) 100 MG tablet    amphetamine-dextroamphetamine (Adderall) 20 MG tablet    Generalized anxiety disorder        Relevant Medications    sertraline (ZOLOFT) 100 MG tablet    amphetamine-dextroamphetamine (Adderall) 20 MG tablet          Diagnoses         Codes Comments    Social anxiety disorder    -  Primary ICD-10-CM: F40.10  ICD-9-CM: 300.23     Attention deficit hyperactivity disorder (ADHD), predominantly inattentive type     ICD-10-CM: F90.0  ICD-9-CM: 314.00     Generalized anxiety disorder     ICD-10-CM: F41.1  ICD-9-CM: 300.02             Functionality: pt having moderate impairment in important areas of daily functioning.  Prognosis: Good dependent on medication/follow up and treatment plan compliance.  star reviewed.  uds reviewed.      Going to restart him back on the Zoloft for the generalized anxiety disorder.  With the medication he is very pleased with progress..  He will continue the Adderall for the ADHD.  .  Refills of all been submitted.   Continuing efforts to promote the therapeutic alliance, address the patient's issues, and strengthen self awareness, insights, and coping skills Patient is agreeable to call the Clinic with worsening symptoms.    Patient is aware to call 911 or go  to the nearest ER should begin having SI/HI. RTC 12 weeks             This document has been electronically signed by ARMANDO Hebert on   August 28, 2024 09:36 EDT.

## 2024-11-26 ENCOUNTER — OFFICE VISIT (OUTPATIENT)
Dept: PSYCHIATRY | Facility: CLINIC | Age: 19
End: 2024-11-26
Payer: COMMERCIAL

## 2024-11-26 VITALS
HEIGHT: 70 IN | BODY MASS INDEX: 24.39 KG/M2 | HEART RATE: 93 BPM | OXYGEN SATURATION: 99 % | DIASTOLIC BLOOD PRESSURE: 85 MMHG | SYSTOLIC BLOOD PRESSURE: 131 MMHG | WEIGHT: 170.4 LBS

## 2024-11-26 DIAGNOSIS — F90.0 ATTENTION DEFICIT HYPERACTIVITY DISORDER (ADHD), PREDOMINANTLY INATTENTIVE TYPE: Primary | ICD-10-CM

## 2024-11-26 DIAGNOSIS — F41.1 GENERALIZED ANXIETY DISORDER: ICD-10-CM

## 2024-11-26 DIAGNOSIS — F40.10 SOCIAL ANXIETY DISORDER: ICD-10-CM

## 2024-11-26 PROCEDURE — 99214 OFFICE O/P EST MOD 30 MIN: CPT | Performed by: NURSE PRACTITIONER

## 2024-11-26 RX ORDER — SERTRALINE HYDROCHLORIDE 100 MG/1
100 TABLET, FILM COATED ORAL DAILY
Qty: 30 TABLET | Refills: 2 | Status: SHIPPED | OUTPATIENT
Start: 2024-11-26

## 2024-11-26 RX ORDER — DEXTROAMPHETAMINE SACCHARATE, AMPHETAMINE ASPARTATE, DEXTROAMPHETAMINE SULFATE AND AMPHETAMINE SULFATE 3.75; 3.75; 3.75; 3.75 MG/1; MG/1; MG/1; MG/1
15 TABLET ORAL DAILY
Qty: 30 TABLET | Refills: 0 | Status: SHIPPED | OUTPATIENT
Start: 2024-11-26

## 2024-11-26 NOTE — PROGRESS NOTES
Truman Sherwood is a 19 y.o. male is here today for medication management follow-up.    Chief Complaint:  Recheck ADHD    History of Present Illness: Patient presents for his follow-up appointment at [facility]. He states he has not been taking the adderall as much as he was and has been taking half tablet when he does take it.  He states that half a tablet does not work quite as well but he does not really feel like he needs a whole 1.  He denies any depression.  Anxiety is currently stable and he rates it a 1 out of 10 with 10 being severe.  Denies any negative side effects to the meds.  Has recently gotten  and is very happy about this.  Sleep is adequate.  Mood is stable.Body mass index is 24.45 kg/m².  No acute medical stressors.  The Adderall does still help with focus and concentration and he tries to take it up around the middle of the day and it lasts until about 7:00 because that is the type of shift he works.    .         . The following portions of the patient's history were reviewed and updated as appropriate: allergies, current medications, past family history, past medical history, past social history, past surgical history and problem list.    Review of Systems   Constitutional:  Negative for activity change, appetite change and fatigue.   HENT: Negative.     Eyes:  Negative for visual disturbance.   Respiratory: Negative.     Cardiovascular: Negative.    Gastrointestinal:  Negative for nausea.   Endocrine: Negative.    Genitourinary: Negative.    Musculoskeletal:  Negative for arthralgias.   Skin: Negative.    Allergic/Immunologic: Negative.    Neurological:  Negative for dizziness, seizures and headaches.   Hematological: Negative.    Psychiatric/Behavioral:  Negative for agitation, behavioral problems, confusion, decreased concentration, dysphoric mood, hallucinations, self-injury, sleep disturbance and suicidal ideas. The patient is nervous/anxious. The  "patient is not hyperactive.      Reviewed copied data and there are no changes    Objective   Physical Exam  Vitals reviewed.   Constitutional:       Appearance: Normal appearance.   Musculoskeletal:      Cervical back: Normal range of motion and neck supple.   Neurological:      General: No focal deficit present.      Mental Status: He is alert and oriented to person, place, and time.   Psychiatric:         Attention and Perception: Attention normal.         Mood and Affect: Mood normal.         Speech: Speech normal.         Behavior: Behavior normal. Behavior is cooperative.         Thought Content: Thought content normal.         Cognition and Memory: Cognition normal.         Judgment: Judgment normal.      Comments: Pleasant and cooperative       Blood pressure 131/85, pulse 93, height 177.8 cm (70\"), weight 77.3 kg (170 lb 6.4 oz), SpO2 99%.    Medication List:   Current Outpatient Medications   Medication Sig Dispense Refill    sertraline (ZOLOFT) 100 MG tablet Take 1 tablet by mouth Daily. 30 tablet 2    amphetamine-dextroamphetamine (ADDERALL) 15 MG tablet Take 1 tablet by mouth Daily. 30 tablet 0    cetirizine (zyrTEC) 10 MG tablet Take 1 tablet by mouth Daily.       No current facility-administered medications for this visit.     Reviewed copied data and there are no changes    Mental Status Exam:   Hygiene:   good  Cooperation:  Cooperative  Eye Contact:  Good  Psychomotor Behavior:  Appropriate  Affect:  Full range  Hopelessness: Denies  Speech:  Normal  Thought Process:  Goal directed  Thought Content:  Normal  Suicidal:  None  Homicidal:  None  Hallucinations:  None  Delusion:  None  Memory:  Intact  Orientation:  Person, Place, Time and Situation  Reliability:  good  Insight:  Good  Judgement:  Good  Impulse Control:  Good  Physical/Medical Issues:  No     Assessment & Plan   Problems Addressed this Visit    None  Visit Diagnoses       Attention deficit hyperactivity disorder (ADHD), predominantly " inattentive type    -  Primary    Relevant Medications    amphetamine-dextroamphetamine (ADDERALL) 15 MG tablet    sertraline (ZOLOFT) 100 MG tablet    Social anxiety disorder        Relevant Medications    amphetamine-dextroamphetamine (ADDERALL) 15 MG tablet    sertraline (ZOLOFT) 100 MG tablet    Generalized anxiety disorder        Relevant Medications    amphetamine-dextroamphetamine (ADDERALL) 15 MG tablet    sertraline (ZOLOFT) 100 MG tablet          Diagnoses         Codes Comments    Attention deficit hyperactivity disorder (ADHD), predominantly inattentive type    -  Primary ICD-10-CM: F90.0  ICD-9-CM: 314.00     Social anxiety disorder     ICD-10-CM: F40.10  ICD-9-CM: 300.23     Generalized anxiety disorder     ICD-10-CM: F41.1  ICD-9-CM: 300.02             Functionality: pt having moderate impairment in important areas of daily functioning.  Prognosis: Good dependent on medication/follow up and treatment plan compliance.  star reviewed.  uds reviewed.      He will continue the Zoloft for the generalized anxiety disorder.  I am going to change his Adderall to 15 mg a day and discussed this with him.  He is happy with that and will go to give that a try.  Refills have been submitted.     Continuing efforts to promote the therapeutic alliance, address the patient's issues, and strengthen self awareness, insights, and coping skills Patient is agreeable to call the Clinic with worsening symptoms.    Patient is aware to call 911 or go to the nearest ER should begin having SI/HI. RTC 12 weeks.  Sooner if needed             This document has been electronically signed by ARMANDO Hebert on   November 26, 2024 09:25 EST.

## 2025-01-17 DIAGNOSIS — F90.0 ATTENTION DEFICIT HYPERACTIVITY DISORDER (ADHD), PREDOMINANTLY INATTENTIVE TYPE: ICD-10-CM

## 2025-01-17 RX ORDER — DEXTROAMPHETAMINE SACCHARATE, AMPHETAMINE ASPARTATE, DEXTROAMPHETAMINE SULFATE AND AMPHETAMINE SULFATE 3.75; 3.75; 3.75; 3.75 MG/1; MG/1; MG/1; MG/1
15 TABLET ORAL DAILY
Qty: 30 TABLET | Refills: 0 | Status: SHIPPED | OUTPATIENT
Start: 2025-01-17

## 2025-02-27 ENCOUNTER — OFFICE VISIT (OUTPATIENT)
Dept: PSYCHIATRY | Facility: CLINIC | Age: 20
End: 2025-02-27
Payer: COMMERCIAL

## 2025-02-27 VITALS
WEIGHT: 165.8 LBS | BODY MASS INDEX: 23.74 KG/M2 | HEIGHT: 70 IN | OXYGEN SATURATION: 99 % | DIASTOLIC BLOOD PRESSURE: 86 MMHG | HEART RATE: 95 BPM | SYSTOLIC BLOOD PRESSURE: 138 MMHG

## 2025-02-27 DIAGNOSIS — F40.10 SOCIAL ANXIETY DISORDER: ICD-10-CM

## 2025-02-27 DIAGNOSIS — F90.0 ATTENTION DEFICIT HYPERACTIVITY DISORDER (ADHD), PREDOMINANTLY INATTENTIVE TYPE: Primary | ICD-10-CM

## 2025-02-27 DIAGNOSIS — F41.1 GENERALIZED ANXIETY DISORDER: ICD-10-CM

## 2025-02-27 PROCEDURE — 99214 OFFICE O/P EST MOD 30 MIN: CPT | Performed by: NURSE PRACTITIONER

## 2025-02-27 RX ORDER — SERTRALINE HYDROCHLORIDE 100 MG/1
100 TABLET, FILM COATED ORAL DAILY
Qty: 30 TABLET | Refills: 2 | Status: SHIPPED | OUTPATIENT
Start: 2025-02-27

## 2025-02-27 RX ORDER — DEXTROAMPHETAMINE SACCHARATE, AMPHETAMINE ASPARTATE, DEXTROAMPHETAMINE SULFATE AND AMPHETAMINE SULFATE 5; 5; 5; 5 MG/1; MG/1; MG/1; MG/1
20 TABLET ORAL DAILY
Qty: 30 TABLET | Refills: 0 | Status: SHIPPED | OUTPATIENT
Start: 2025-02-27 | End: 2026-02-27

## 2025-02-27 NOTE — PROGRESS NOTES
Truman Sherwood is a 19 y.o. male is here today for medication management follow-up.    Chief Complaint:  Recheck ADHD    History of Present Illness: Patient presents for his follow-up appointment at [facility].   .  History of Present Illness  The patient is a 19-year-old male who presents for evaluation of anxiety.    He reports that his current medication regimen, which includes a daily dose of Adderall 15 mg, was initially effective but has since diminished in efficacy. He discontinued the medication for a week to assess if tolerance had developed. Upon resumption, he noted an improvement, but not to the extent experienced with the 20 mg dosage. He has not tried the extended-release formulation of Adderall. He typically takes his medication at 1:00 PM, after his lunch break, and finds it effective until the end of his workday. He occasionally uses the medication on weekends when engaged in home projects but generally avoids it due to its appetite-suppressing effects. He has experienced a weight loss of 5 pounds, which he attributes to a hectic work schedule over the past two weeks that has limited his meal times.Body mass index is 23.79 kg/m².   PHQ-2 Depression Screening  Little interest or pleasure in doing things? Not at all   Feeling down, depressed, or hopeless? Not at all   PHQ-2 Total Score 0        He continues to find Zoloft beneficial and reports no depressive symptoms. His anxiety levels are minimal, rating them as 1 or 2 on a scale of 10.    SOCIAL HISTORY  He is  and works as a  at [employer].    MEDICATIONS  Current: Zoloft, Adderall            . The following portions of the patient's history were reviewed and updated as appropriate: allergies, current medications, past family history, past medical history, past social history, past surgical history and problem list.    Review of Systems   Constitutional:  Negative for activity  "change, appetite change and fatigue.   HENT: Negative.     Eyes:  Negative for visual disturbance.   Respiratory: Negative.     Cardiovascular: Negative.    Gastrointestinal:  Negative for nausea.   Endocrine: Negative.    Genitourinary: Negative.    Musculoskeletal:  Negative for arthralgias.   Skin: Negative.    Allergic/Immunologic: Negative.    Neurological:  Negative for dizziness, seizures and headaches.   Hematological: Negative.    Psychiatric/Behavioral:  Negative for agitation, behavioral problems, confusion, decreased concentration, dysphoric mood, hallucinations, self-injury, sleep disturbance and suicidal ideas. The patient is nervous/anxious. The patient is not hyperactive.      Reviewed copied data and there are no changes    Objective   Physical Exam  Vitals reviewed.   Constitutional:       Appearance: Normal appearance.   Musculoskeletal:      Cervical back: Normal range of motion and neck supple.   Neurological:      General: No focal deficit present.      Mental Status: He is alert and oriented to person, place, and time.   Psychiatric:         Attention and Perception: Attention normal.         Mood and Affect: Mood normal.         Speech: Speech normal.         Behavior: Behavior normal. Behavior is cooperative.         Thought Content: Thought content normal.         Cognition and Memory: Cognition normal.         Judgment: Judgment normal.      Comments: Pleasant and cooperative       Blood pressure 138/86, pulse 95, height 177.8 cm (70\"), weight 75.2 kg (165 lb 12.8 oz), SpO2 99%.    Medication List:   Current Outpatient Medications   Medication Sig Dispense Refill    sertraline (ZOLOFT) 100 MG tablet Take 1 tablet by mouth Daily. 30 tablet 2    amphetamine-dextroamphetamine (Adderall) 20 MG tablet Take 1 tablet by mouth Daily. 30 tablet 0    cetirizine (zyrTEC) 10 MG tablet Take 1 tablet by mouth Daily.       No current facility-administered medications for this visit.     Reviewed copied " data and there are no changes    Mental Status Exam:   Hygiene:   good  Cooperation:  Cooperative  Eye Contact:  Good  Psychomotor Behavior:  Appropriate  Affect:  Full range  Hopelessness: Denies  Speech:  Normal  Thought Process:  Goal directed  Thought Content:  Normal  Suicidal:  None  Homicidal:  None  Hallucinations:  None  Delusion:  None  Memory:  Intact  Orientation:  Person, Place, Time and Situation  Reliability:  good  Insight:  Good  Judgement:  Good  Impulse Control:  Good  Physical/Medical Issues:  No     Assessment & Plan   Problems Addressed this Visit    None  Visit Diagnoses       Attention deficit hyperactivity disorder (ADHD), predominantly inattentive type    -  Primary    Relevant Medications    sertraline (ZOLOFT) 100 MG tablet    amphetamine-dextroamphetamine (Adderall) 20 MG tablet    Generalized anxiety disorder        Relevant Medications    sertraline (ZOLOFT) 100 MG tablet    amphetamine-dextroamphetamine (Adderall) 20 MG tablet    Social anxiety disorder        Relevant Medications    sertraline (ZOLOFT) 100 MG tablet    amphetamine-dextroamphetamine (Adderall) 20 MG tablet          Diagnoses         Codes Comments    Attention deficit hyperactivity disorder (ADHD), predominantly inattentive type    -  Primary ICD-10-CM: F90.0  ICD-9-CM: 314.00     Generalized anxiety disorder     ICD-10-CM: F41.1  ICD-9-CM: 300.02     Social anxiety disorder     ICD-10-CM: F40.10  ICD-9-CM: 300.23             Functionality: pt having moderate impairment in important areas of daily functioning.  Prognosis: Good dependent on medication/follow up and treatment plan compliance.  star reviewed.  uds reviewed.  Uds performed today and pending    Increasing the Adderall to 20 mg for ongoing symptomatology.  Refills of all been submitted.    He will continue the Zoloft for the generalized anxiety disorder.      Continuing efforts to promote the therapeutic alliance, address the patient's issues, and  strengthen self awareness, insights, and coping skills Patient is agreeable to call the Clinic with worsening symptoms.    Patient is aware to call 911 or go to the nearest ER should begin having SI/HI. RTC 12 weeks.  Sooner if needed  Patient or patient representative verbalized consent for the use of Ambient Listening during the visit with  ARMANDO Hebert for chart documentation. 2/27/2025  08:36 EST            This document has been electronically signed by ARMANDO Hebert on   February 27, 2025 08:17 EST.

## 2025-03-20 DIAGNOSIS — F90.0 ATTENTION DEFICIT HYPERACTIVITY DISORDER (ADHD), PREDOMINANTLY INATTENTIVE TYPE: ICD-10-CM

## 2025-03-20 RX ORDER — DEXTROAMPHETAMINE SACCHARATE, AMPHETAMINE ASPARTATE, DEXTROAMPHETAMINE SULFATE AND AMPHETAMINE SULFATE 5; 5; 5; 5 MG/1; MG/1; MG/1; MG/1
20 TABLET ORAL DAILY
Qty: 30 TABLET | Refills: 0 | OUTPATIENT
Start: 2025-03-20 | End: 2026-03-20

## 2025-03-28 DIAGNOSIS — F90.0 ATTENTION DEFICIT HYPERACTIVITY DISORDER (ADHD), PREDOMINANTLY INATTENTIVE TYPE: ICD-10-CM

## 2025-03-30 RX ORDER — DEXTROAMPHETAMINE SACCHARATE, AMPHETAMINE ASPARTATE, DEXTROAMPHETAMINE SULFATE AND AMPHETAMINE SULFATE 5; 5; 5; 5 MG/1; MG/1; MG/1; MG/1
20 TABLET ORAL DAILY
Qty: 30 TABLET | Refills: 0 | Status: SHIPPED | OUTPATIENT
Start: 2025-03-30 | End: 2026-03-30

## 2025-04-30 DIAGNOSIS — F90.0 ATTENTION DEFICIT HYPERACTIVITY DISORDER (ADHD), PREDOMINANTLY INATTENTIVE TYPE: ICD-10-CM

## 2025-04-30 RX ORDER — DEXTROAMPHETAMINE SACCHARATE, AMPHETAMINE ASPARTATE, DEXTROAMPHETAMINE SULFATE AND AMPHETAMINE SULFATE 5; 5; 5; 5 MG/1; MG/1; MG/1; MG/1
20 TABLET ORAL DAILY
Qty: 30 TABLET | Refills: 0 | Status: SHIPPED | OUTPATIENT
Start: 2025-04-30 | End: 2026-04-30

## 2025-06-06 DIAGNOSIS — F90.0 ATTENTION DEFICIT HYPERACTIVITY DISORDER (ADHD), PREDOMINANTLY INATTENTIVE TYPE: ICD-10-CM

## 2025-06-09 RX ORDER — DEXTROAMPHETAMINE SACCHARATE, AMPHETAMINE ASPARTATE, DEXTROAMPHETAMINE SULFATE AND AMPHETAMINE SULFATE 5; 5; 5; 5 MG/1; MG/1; MG/1; MG/1
20 TABLET ORAL DAILY
Qty: 30 TABLET | Refills: 0 | Status: SHIPPED | OUTPATIENT
Start: 2025-06-09 | End: 2025-07-09

## 2025-07-07 DIAGNOSIS — F90.0 ATTENTION DEFICIT HYPERACTIVITY DISORDER (ADHD), PREDOMINANTLY INATTENTIVE TYPE: ICD-10-CM

## 2025-07-07 RX ORDER — DEXTROAMPHETAMINE SACCHARATE, AMPHETAMINE ASPARTATE, DEXTROAMPHETAMINE SULFATE AND AMPHETAMINE SULFATE 5; 5; 5; 5 MG/1; MG/1; MG/1; MG/1
20 TABLET ORAL DAILY
Qty: 30 TABLET | Refills: 0 | Status: SHIPPED | OUTPATIENT
Start: 2025-07-07 | End: 2025-08-06

## 2025-07-29 ENCOUNTER — OFFICE VISIT (OUTPATIENT)
Dept: PSYCHIATRY | Facility: CLINIC | Age: 20
End: 2025-07-29
Payer: COMMERCIAL

## 2025-07-29 VITALS
OXYGEN SATURATION: 96 % | WEIGHT: 160.6 LBS | DIASTOLIC BLOOD PRESSURE: 84 MMHG | HEIGHT: 70 IN | SYSTOLIC BLOOD PRESSURE: 135 MMHG | BODY MASS INDEX: 22.99 KG/M2 | HEART RATE: 88 BPM

## 2025-07-29 DIAGNOSIS — F90.0 ATTENTION DEFICIT HYPERACTIVITY DISORDER (ADHD), PREDOMINANTLY INATTENTIVE TYPE: Primary | ICD-10-CM

## 2025-07-29 DIAGNOSIS — F40.10 SOCIAL ANXIETY DISORDER: ICD-10-CM

## 2025-07-29 DIAGNOSIS — F41.1 GENERALIZED ANXIETY DISORDER: ICD-10-CM

## 2025-07-29 PROCEDURE — 99214 OFFICE O/P EST MOD 30 MIN: CPT | Performed by: NURSE PRACTITIONER

## 2025-07-29 RX ORDER — DEXTROAMPHETAMINE SACCHARATE, AMPHETAMINE ASPARTATE, DEXTROAMPHETAMINE SULFATE AND AMPHETAMINE SULFATE 2.5; 2.5; 2.5; 2.5 MG/1; MG/1; MG/1; MG/1
10 TABLET ORAL 2 TIMES DAILY
Qty: 60 TABLET | Refills: 0 | Status: SHIPPED | OUTPATIENT
Start: 2025-07-29 | End: 2026-07-29

## 2025-07-29 RX ORDER — SERTRALINE HYDROCHLORIDE 100 MG/1
100 TABLET, FILM COATED ORAL DAILY
Qty: 30 TABLET | Refills: 2 | Status: SHIPPED | OUTPATIENT
Start: 2025-07-29

## 2025-07-29 NOTE — PROGRESS NOTES
Truman Sherwood is a 20 y.o. male is here today for medication management follow-up.Patient or patient representative verbalized consent for the use of Ambient Listening during the visit with  ARMANDO Hebert for chart documentation. 7/29/2025  09:32 EDT     Chief Complaint:  Recheck ADHD    History of Present Illness: Patient presents for his follow-up appointment at [facility].   .  History of Present Illness  The patient is a 20-year-old male who presents for ADHD, anxiety, and depression.    He reports that the increased dosage of Adderall has been beneficial, but he wishes its effects would last longer. His work schedule is from 11 PM to 7 AM, and he typically takes Adderall around 1 AM. The medication usually starts to take effect after his lunch break and lasts until the end of his shift. He does not experience any significant issues between 11 PM and 1 AM. He takes Adderall daily, even on his days off, as he often has other commitments. He finds that a short nap on Friday mornings (Tuesday or Wednesday for him) helps him stay awake until nighttime. He also takes a 2 to 3-hour nap after work before engaging in other activities. He believes that a 10 mg dose twice a day would be sufficient to cover him for the second half of the day. He relies heavily on caffeine for energy. He recalls a period when he was taking 1.5 tablets per day, which was effective, but he reduced it to 1 tablet when he had less to do after work.Body mass index is 23.04 kg/m².  Weight loss of 5 pounds since last office visit.    He continues to take Zoloft and reports no symptoms of depression. His anxiety level is minimal, rating it at 0 or 1 on a scale of 1 to 10.    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            . The following portions of the patient's history were reviewed and updated as appropriate: allergies, current medications, past family history, past medical history, past social history, past surgical history and problem list.    Review of Systems   Constitutional:  Negative for activity change, appetite change and fatigue.   HENT: Negative.     Eyes:  Negative for visual disturbance.   Respiratory: Negative.     Cardiovascular: Negative.    Gastrointestinal:  Negative for nausea.   Endocrine: Negative.    Genitourinary: Negative.    Musculoskeletal:  Negative for arthralgias.   Skin: Negative.    Allergic/Immunologic: Negative.    Neurological:  Negative for dizziness, seizures and headaches.   Hematological: Negative.    Psychiatric/Behavioral:  Negative for agitation, behavioral problems, confusion, decreased concentration, dysphoric mood, hallucinations, self-injury, sleep disturbance and suicidal ideas. The patient is nervous/anxious. The patient is not hyperactive.      Reviewed copied data and there are no changes    Objective   Physical Exam  Vitals reviewed.   Constitutional:       Appearance: Normal appearance.   Musculoskeletal:      Cervical back: Normal range of motion and neck supple.   Neurological:      General: No focal deficit present.      Mental Status: He is alert and oriented to person, place, and time.   Psychiatric:         Attention and Perception: Attention normal.         Mood and Affect: Mood normal.         Speech: Speech normal.         Behavior: Behavior normal. Behavior is cooperative.         Thought Content: Thought content normal.         Cognition and Memory: Cognition normal.         Judgment: Judgment normal.       "Comments: Pleasant and cooperative       Blood pressure 135/84, pulse 88, height 177.8 cm (70\"), weight 72.8 kg (160 lb 9.6 oz), SpO2 96%.    Medication List:   Current Outpatient Medications   Medication Sig Dispense Refill    sertraline (ZOLOFT) 100 MG tablet Take 1 tablet by mouth Daily. 30 tablet 2    amphetamine-dextroamphetamine (Adderall) 10 MG tablet Take 1 tablet by mouth 2 (Two) Times a Day. 60 tablet 0    cetirizine (zyrTEC) 10 MG tablet Take 1 tablet by mouth Daily.       No current facility-administered medications for this visit.     Reviewed copied data and there are no changes    Mental Status Exam:   Hygiene:   good  Cooperation:  Cooperative  Eye Contact:  Good  Psychomotor Behavior:  Appropriate  Affect:  Full range  Hopelessness: Denies  Speech:  Normal  Thought Process:  Goal directed  Thought Content:  Normal  Suicidal:  None  Homicidal:  None  Hallucinations:  None  Delusion:  None  Memory:  Intact  Orientation:  Person, Place, Time and Situation  Reliability:  good  Insight:  Good  Judgement:  Good  Impulse Control:  Good  Physical/Medical Issues:  No     Assessment & Plan   Problems Addressed this Visit    None  Visit Diagnoses         Attention deficit hyperactivity disorder (ADHD), predominantly inattentive type    -  Primary    Relevant Medications    sertraline (ZOLOFT) 100 MG tablet    amphetamine-dextroamphetamine (Adderall) 10 MG tablet      Generalized anxiety disorder        Relevant Medications    sertraline (ZOLOFT) 100 MG tablet    amphetamine-dextroamphetamine (Adderall) 10 MG tablet      Social anxiety disorder        Relevant Medications    sertraline (ZOLOFT) 100 MG tablet    amphetamine-dextroamphetamine (Adderall) 10 MG tablet          Diagnoses         Codes Comments      Attention deficit hyperactivity disorder (ADHD), predominantly inattentive type    -  Primary ICD-10-CM: F90.0  ICD-9-CM: 314.00       Generalized anxiety disorder     ICD-10-CM: F41.1  ICD-9-CM: 300.02  " "     Social anxiety disorder     ICD-10-CM: F40.10  ICD-9-CM: 300.23             Functionality: pt having moderate impairment in important areas of daily functioning.  Prognosis: Good dependent on medication/follow up and treatment plan compliance.  star reviewed.  king reviewed.    Assessment & Plan  Problems:  - Attention Deficit Hyperactivity Disorder (ADHD)  - Anxiety  - Depression    Content of Therapy:  The session focused on the effectiveness of the current Adderall dosage, the patient's busy schedule, and the \"crash\" experienced after the medication wears off. 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 Additionally, the patient's anxiety and depression levels were assessed, and it was noted that he is managing well with his current medication regimen.    Clinical Impression:  The patient reports that the current dosage of Adderall is effective but wishes it lasted longer due to his busy schedule, particularly with his new business venture. He experiences a \"crash\" after the medication wears off, especially when he has to work on his new business after his regular job. Anxiety levels are reported to be minimal (0-1 on a scale of 1-10), indicating good control with his current medication regimen. There are no reported symptoms of depression, and the patient is currently taking Zoloft.    Therapeutic Intervention:  The patient was advised to use caffeine as a supplement when necessary instead of increasing the Adderall dosage immediately. A prescription for Adderall 10 mg, to be taken twice daily, will be provided.    Plan:  - Continue Adderall 10 mg twice daily.  - Continue Zoloft as prescribed.  - Use caffeine as a supplement when experiencing a \"crash.\"    Follow-up:  A follow-up visit is scheduled in 3 months.    Notes & Risk Factors:  *      Continuing efforts to promote the therapeutic alliance, address the patient's issues, and strengthen self " awareness, insights, and coping skills Patient is agreeable to call the Clinic with worsening symptoms.    Patient is aware to call 911 or go to the nearest ER should begin having SI/HI. RTC 12 weeks.  Sooner if needed  Patient or patient representative verbalized consent for the use of Ambient Listening during the visit with  ARMANDO Hebert for chart documentation. 7/29/2025  08:36 EST            This document has been electronically signed by ARMANDO Hebert on   July 29, 2025 09:47 EDT.